# Patient Record
Sex: MALE | Race: WHITE | NOT HISPANIC OR LATINO | URBAN - METROPOLITAN AREA
[De-identification: names, ages, dates, MRNs, and addresses within clinical notes are randomized per-mention and may not be internally consistent; named-entity substitution may affect disease eponyms.]

---

## 2019-07-02 ENCOUNTER — EMERGENCY (EMERGENCY)
Facility: HOSPITAL | Age: 34
LOS: 1 days | Discharge: HOME | End: 2019-07-02
Attending: EMERGENCY MEDICINE | Admitting: EMERGENCY MEDICINE
Payer: SELF-PAY

## 2019-07-02 VITALS
TEMPERATURE: 98 F | SYSTOLIC BLOOD PRESSURE: 100 MMHG | HEIGHT: 67 IN | HEART RATE: 96 BPM | RESPIRATION RATE: 16 BRPM | OXYGEN SATURATION: 95 % | DIASTOLIC BLOOD PRESSURE: 74 MMHG | WEIGHT: 250 LBS

## 2019-07-02 DIAGNOSIS — R41.82 ALTERED MENTAL STATUS, UNSPECIFIED: ICD-10-CM

## 2019-07-02 DIAGNOSIS — F10.129 ALCOHOL ABUSE WITH INTOXICATION, UNSPECIFIED: ICD-10-CM

## 2019-07-02 PROCEDURE — 99053 MED SERV 10PM-8AM 24 HR FAC: CPT

## 2019-07-02 PROCEDURE — 99284 EMERGENCY DEPT VISIT MOD MDM: CPT

## 2019-07-02 NOTE — ED ADULT TRIAGE NOTE - CHIEF COMPLAINT QUOTE
Pt brought in by EMS after pt was found "slumped" on the sidewalk at [facility] and [place].  Pt states "I had a really good Tuesday." +slurred speech, unsteady gait.

## 2019-07-03 NOTE — ED ADULT NURSE NOTE - CHIEF COMPLAINT QUOTE
Pt brought in by EMS after pt was found "slumped" on the sidewalk at [facility] and [place].  Pt states "I had a really good Tuesday." +slurred speech, unsteady gait.

## 2019-07-03 NOTE — ED PROVIDER NOTE - OBJECTIVE STATEMENT
33 y o male with no PMHx was BIBA after pt was found "slumped" on the sidewalk. Pt states he was planning on walking home to enjoy the weather. +Slurred speech and unsteady gait. Admits to ETOH today, no other complaints, unable to cooperate with remainder of history. 33 y o male with no PMHx was BIBA after pt was found "slumped" on the sidewalk. Pt states he was planning on walking home to enjoy the weather. +Slurred speech and unsteady gait. Admits to ETOH today, no other complaints, unable to cooperate with remainder of history. No trauma